# Patient Record
Sex: MALE | Race: WHITE
[De-identification: names, ages, dates, MRNs, and addresses within clinical notes are randomized per-mention and may not be internally consistent; named-entity substitution may affect disease eponyms.]

---

## 2020-01-04 ENCOUNTER — HOSPITAL ENCOUNTER (EMERGENCY)
Dept: HOSPITAL 4 - SED | Age: 35
Discharge: HOME | End: 2020-01-04
Payer: MEDICAID

## 2020-01-04 VITALS — WEIGHT: 200 LBS | HEIGHT: 73 IN | BODY MASS INDEX: 26.51 KG/M2

## 2020-01-04 VITALS — SYSTOLIC BLOOD PRESSURE: 143 MMHG
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
## 2020-01-04 DIAGNOSIS — S16.1XXA: Primary | ICD-10-CM
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
## 2020-01-04 PROCEDURE — 96372 THER/PROPH/DIAG INJ SC/IM: CPT

## 2020-01-04 PROCEDURE — 72050 X-RAY EXAM NECK SPINE 4/5VWS: CPT

## 2020-01-04 PROCEDURE — 99283 EMERGENCY DEPT VISIT LOW MDM: CPT

## 2020-01-04 NOTE — NUR
Patient given written and verbal discharge instructions and verbalizes 
understanding.  ER MD discussed with patient the results and treatment 
provided. Patient in stable condition. ID arm band removed. 

Rx of Naprosyn given. Patient educated on pain management and to follow up with 
PMD. Pain Scale 2/10.

Opportunity for questions provided and answered. Medication side effect fact 
sheet provided.
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
## 2020-02-06 ENCOUNTER — HOSPITAL ENCOUNTER (EMERGENCY)
Dept: HOSPITAL 4 - SED | Age: 35
Discharge: HOME | End: 2020-02-06
Payer: COMMERCIAL

## 2020-02-06 VITALS — SYSTOLIC BLOOD PRESSURE: 131 MMHG | BODY MASS INDEX: 29.16 KG/M2 | WEIGHT: 220 LBS | HEIGHT: 73 IN

## 2020-02-06 VITALS — SYSTOLIC BLOOD PRESSURE: 130 MMHG

## 2020-02-06 DIAGNOSIS — R05: Primary | ICD-10-CM

## 2020-02-06 DIAGNOSIS — M79.18: ICD-10-CM

## 2020-02-06 DIAGNOSIS — R06.02: ICD-10-CM
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
## 2020-03-03 ENCOUNTER — HOSPITAL ENCOUNTER (EMERGENCY)
Dept: HOSPITAL 4 - SED | Age: 35
LOS: 1 days | Discharge: HOME | End: 2020-03-04
Payer: MEDICAID

## 2020-03-03 VITALS — WEIGHT: 220 LBS | BODY MASS INDEX: 29.16 KG/M2 | HEIGHT: 73 IN

## 2020-03-03 VITALS — SYSTOLIC BLOOD PRESSURE: 130 MMHG

## 2020-03-03 DIAGNOSIS — S01.511A: Primary | ICD-10-CM
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
## 2020-03-03 DIAGNOSIS — Z88.0: ICD-10-CM

## 2020-03-03 PROCEDURE — 12013 RPR F/E/E/N/L/M 2.6-5.0 CM: CPT

## 2020-03-03 PROCEDURE — 99282 EMERGENCY DEPT VISIT SF MDM: CPT
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
## 2020-03-04 VITALS — SYSTOLIC BLOOD PRESSURE: 134 MMHG

## 2020-03-04 NOTE — NUR
Patient given written and verbal discharge instructions and verbalizes 
understanding.  ER MD discussed with patient the results and treatment 
provided. Patient in stable condition. ID arm band removed.

Rx of tylenol 3 given. Patient educated on pain management and to follow up 
with PMD. Pain Scale 0/10.

Opportunity for questions provided and answered. Medication side effect fact 
sheet provided.

## 2021-10-05 ENCOUNTER — HOSPITAL ENCOUNTER (EMERGENCY)
Dept: HOSPITAL 4 - SED | Age: 36
Discharge: HOME | End: 2021-10-05
Payer: MEDICAID

## 2021-10-05 VITALS — SYSTOLIC BLOOD PRESSURE: 154 MMHG

## 2021-10-05 VITALS — HEIGHT: 73 IN | WEIGHT: 200 LBS | BODY MASS INDEX: 26.51 KG/M2

## 2021-10-05 VITALS — SYSTOLIC BLOOD PRESSURE: 142 MMHG
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
## 2021-10-05 DIAGNOSIS — S66.911A: Primary | ICD-10-CM

## 2021-10-05 DIAGNOSIS — S66.912A: ICD-10-CM

## 2021-10-05 DIAGNOSIS — Z88.0: ICD-10-CM

## 2021-10-05 DIAGNOSIS — M54.16: ICD-10-CM

## 2021-10-05 DIAGNOSIS — Z79.899: ICD-10-CM
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
## 2021-10-05 LAB
ALBUMIN SERPL BCP-MCNC: 3.5 G/DL (ref 3.4–4.8)
ALT SERPL W P-5'-P-CCNC: 22 U/L (ref 12–78)
ANION GAP SERPL CALCULATED.3IONS-SCNC: 11 MMOL/L (ref 5–15)
AST SERPL W P-5'-P-CCNC: 14 U/L (ref 10–37)
BASOPHILS # BLD AUTO: 0 K/UL (ref 0–0.2)
BASOPHILS NFR BLD AUTO: 0.4 % (ref 0–2)
BILIRUB SERPL-MCNC: 0.4 MG/DL (ref 0–1)
BUN SERPL-MCNC: 9 MG/DL (ref 8–21)
CALCIUM SERPL-MCNC: 9 MG/DL (ref 8.4–11)
CHLORIDE SERPL-SCNC: 100 MMOL/L (ref 98–107)
CREAT SERPL-MCNC: 0.91 MG/DL (ref 0.55–1.3)
EOSINOPHIL # BLD AUTO: 0.2 K/UL (ref 0–0.4)
EOSINOPHIL NFR BLD AUTO: 2.3 % (ref 0–4)
ERYTHROCYTE [DISTWIDTH] IN BLOOD BY AUTOMATED COUNT: 13.9 % (ref 9–15)
GFR SERPL CREATININE-BSD FRML MDRD: 123 ML/MIN (ref 90–?)
GLUCOSE SERPL-MCNC: 108 MG/DL (ref 70–99)
HCT VFR BLD AUTO: 39 % (ref 36–54)
HGB BLD-MCNC: 13.1 G/DL (ref 14–18)
INR PPP: 0.9 (ref 0.8–1.2)
LYMPHOCYTES # BLD AUTO: 1.7 K/UL (ref 1–5.5)
LYMPHOCYTES NFR BLD AUTO: 21.8 % (ref 20.5–51.5)
MCH RBC QN AUTO: 28 PG (ref 27–31)
MCHC RBC AUTO-ENTMCNC: 34 % (ref 32–36)
MCV RBC AUTO: 84 FL (ref 79–98)
MONOCYTES # BLD MANUAL: 0.5 K/UL (ref 0–1)
MONOCYTES # BLD MANUAL: 6.6 % (ref 1.7–9.3)
NEUTROPHILS # BLD AUTO: 5.3 K/UL (ref 1.8–7.7)
NEUTROPHILS NFR BLD AUTO: 68.9 % (ref 40–70)
PLATELET # BLD AUTO: 323 K/UL (ref 130–430)
POTASSIUM SERPL-SCNC: 3.4 MMOL/L (ref 3.5–5.1)
PROTHROMBIN TIME: 9.8 SECS (ref 9.5–12.5)
RBC # BLD AUTO: 4.62 MIL/UL (ref 4.2–6.2)
SODIUM SERPLBLD-SCNC: 139 MMOL/L (ref 136–145)
WBC # BLD AUTO: 7.7 K/UL (ref 4.8–10.8)

## 2021-10-05 PROCEDURE — 36415 COLL VENOUS BLD VENIPUNCTURE: CPT

## 2021-10-05 PROCEDURE — 99285 EMERGENCY DEPT VISIT HI MDM: CPT

## 2021-10-05 PROCEDURE — 93971 EXTREMITY STUDY: CPT

## 2021-10-05 PROCEDURE — 85610 PROTHROMBIN TIME: CPT

## 2021-10-05 PROCEDURE — 85025 COMPLETE CBC W/AUTO DIFF WBC: CPT

## 2021-10-05 PROCEDURE — 72100 X-RAY EXAM L-S SPINE 2/3 VWS: CPT

## 2021-10-05 PROCEDURE — 80053 COMPREHEN METABOLIC PANEL: CPT

## 2021-10-05 NOTE — NUR
Patient given written and verbal discharge instructions and verbalizes 
understanding.  ER MD discussed with patient the results and treatment 
provided. Patient in stable condition. ID arm band removed. Rx of Prednisone 
given. Patient educated on pain management and to follow up with PMD. Pain 
Scale 0/10 Opportunity for questions provided and answered. Medication side 
effect fact sheet provided.
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
## 2021-10-11 ENCOUNTER — HOSPITAL ENCOUNTER (EMERGENCY)
Dept: HOSPITAL 4 - SED | Age: 36
Discharge: HOME | End: 2021-10-11
Payer: MEDICAID

## 2021-10-11 VITALS — HEIGHT: 73 IN | BODY MASS INDEX: 25.18 KG/M2 | WEIGHT: 190 LBS

## 2021-10-11 VITALS — SYSTOLIC BLOOD PRESSURE: 152 MMHG

## 2021-10-11 DIAGNOSIS — Z76.0: ICD-10-CM

## 2021-10-11 DIAGNOSIS — Z79.899: ICD-10-CM

## 2021-10-11 DIAGNOSIS — Z88.0: ICD-10-CM

## 2021-10-11 DIAGNOSIS — M54.50: Primary | ICD-10-CM
